# Patient Record
(demographics unavailable — no encounter records)

---

## 2025-05-16 NOTE — OB HISTORY
[Frequency: Q ___ days] : menstrual periods occur approximately every [unfilled] days [Prior Menses:  ___ Date] : date of prior menstruation was [unfilled] [Menarche Age: ____] : age at menarche was [unfilled] [___] : Living: [unfilled] [Normal Amount/Duration] : was abnormal

## 2025-05-16 NOTE — HISTORY OF PRESENT ILLNESS
[de-identified] : 50 year old referred from our Emergency department, for Iron Deficiency Anemia. She presented to the ED with fainting, weakness, SOB. She was found to be anemic with a HGB 7.4, HCT 27.4, MCV 63.3. She was sent to hematologist's office and received 1 dose of IV iron last week. She complains of feeling very tired, SOB with exertion, and dizzy at times. She states she has been anemic for several years. She had EGD and colonoscopy done in 2017.  Was found to have multiple, bleeding, upper and lower GI tract AVM's. She was treated with both blood transfusions and Iron infusions years ago. She is currently taking PO iron, once to twice daily as tolerated. She admits to heavy menstrual periods, especially the first 2 days of cycle. [de-identified] : 4/29/24 Patient returns for a follow up visit for SHAGGY. She is very symptomatic today. Complaining of weakness, fatigue, lightheadedness. She states she went to the ER few days ago but was sent home without transfusion. She continues to have heavy menstrual bleeding, sometimes twice per month. She is scheduled to see her GYN next week.  5/16/25: Patient presents for follow up for her SHAGGY. She sent to [facility] ER few days ago with complaints of headaches, weakness and dizziness. She states her hgb was 7, and she was sent home. She continues to have very heavy menses. Admits that she has not seen GYN or GI as recommended at several previous visits. Her last dose of feraheme was given in April 2024. GI work up was done several years ago, she has h/o upper and lower GI AVM's.  8/12/24 Patient returns for a follow up visit for SHAGGY. She has been treated with IV iron, last dose was given in May 2024. Overall, she is feeling well. She denies any fever, cough, SOB, denies headache or  dizziness. Admits to chronic night sweats, which she has had for approximately 20 years.

## 2025-05-16 NOTE — PHYSICAL EXAM
[Fully active, able to carry on all pre-disease performance without restriction] : Status 0 - Fully active, able to carry on all pre-disease performance without restriction [Normal] : affect appropriate [de-identified] : But somewhat overweight

## 2025-05-16 NOTE — HISTORY OF PRESENT ILLNESS
[de-identified] : 50 year old referred from our Emergency department, for Iron Deficiency Anemia. She presented to the ED with fainting, weakness, SOB. She was found to be anemic with a HGB 7.4, HCT 27.4, MCV 63.3. She was sent to hematologist's office and received 1 dose of IV iron last week. She complains of feeling very tired, SOB with exertion, and dizzy at times. She states she has been anemic for several years. She had EGD and colonoscopy done in 2017.  Was found to have multiple, bleeding, upper and lower GI tract AVM's. She was treated with both blood transfusions and Iron infusions years ago. She is currently taking PO iron, once to twice daily as tolerated. She admits to heavy menstrual periods, especially the first 2 days of cycle. [de-identified] : 4/29/24 Patient returns for a follow up visit for SHAGGY. She is very symptomatic today. Complaining of weakness, fatigue, lightheadedness. She states she went to the ER few days ago but was sent home without transfusion. She continues to have heavy menstrual bleeding, sometimes twice per month. She is scheduled to see her GYN next week.  5/16/25: Patient presents for follow up for her SHAGGY. She sent to [facility] ER few days ago with complaints of headaches, weakness and dizziness. She states her hgb was 7, and she was sent home. She continues to have very heavy menses. Admits that she has not seen GYN or GI as recommended at several previous visits. Her last dose of feraheme was given in April 2024. GI work up was done several years ago, she has h/o upper and lower GI AVM's.  8/12/24 Patient returns for a follow up visit for SHAGGY. She has been treated with IV iron, last dose was given in May 2024. Overall, she is feeling well. She denies any fever, cough, SOB, denies headache or  dizziness. Admits to chronic night sweats, which she has had for approximately 20 years.

## 2025-05-16 NOTE — REVIEW OF SYSTEMS
[Vision Problems] : vision problems [Diarrhea: Grade 0] : Diarrhea: Grade 0 [Negative] : Allergic/Immunologic [Fatigue] : no fatigue [SOB on Exertion] : no shortness of breath during exertion [Dizziness] : dizziness [Fainting] : no fainting

## 2025-05-16 NOTE — PHYSICAL EXAM
[Fully active, able to carry on all pre-disease performance without restriction] : Status 0 - Fully active, able to carry on all pre-disease performance without restriction [Normal] : affect appropriate [de-identified] : But somewhat overweight

## 2025-05-16 NOTE — ASSESSMENT
[FreeTextEntry1] : 52 year old female with SHAGGY.  # Iron deficiency anemia likely secondary to menorrhagia - s/p Feraheme 510 mg x 2 (7/23, then again in 5/24). - CBC and iron studies showed good response to iron infusion. - Will repeat CBC, iron studies and ferritin today - Feraheme 500 mg weekly X 2 ordered. - Patient was again advised to repeat GI work up since it has been more than 7 years  since last EGD/colonoscopy and she was found at that time to have multiple, bleeding,  upper and lower GI tract AVM's. - Patient again advised to see GYN, name given, as she wants to find a new one.  # Mildly elevated LFT's in 8/23 - repeat HFP normal in 4/24. -patient states she has H/O Hepatitis B, never treated -CT A/P done in ER on 4/25 no acute abnormality, hepatic steatosis. -Heb B panel: HepBsAg, HepBcAb positive consistent with previous infection.  All questions answered.  RTC 3 months for F/U with Dr Butcher